# Patient Record
Sex: FEMALE | Race: WHITE | NOT HISPANIC OR LATINO | Employment: UNEMPLOYED | URBAN - METROPOLITAN AREA
[De-identification: names, ages, dates, MRNs, and addresses within clinical notes are randomized per-mention and may not be internally consistent; named-entity substitution may affect disease eponyms.]

---
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
## 2018-09-04 ENCOUNTER — HISTORICAL (OUTPATIENT)
Dept: WOUND CARE | Facility: HOSPITAL | Age: 18
End: 2018-09-04

## 2018-09-08 LAB — FINAL CULTURE: NORMAL

## 2019-01-23 ENCOUNTER — HISTORICAL (OUTPATIENT)
Dept: WOUND CARE | Facility: HOSPITAL | Age: 19
End: 2019-01-23

## 2019-01-25 LAB — FINAL CULTURE: NORMAL

## 2019-04-23 ENCOUNTER — HOSPITAL ENCOUNTER (OUTPATIENT)
Dept: TELEMEDICINE | Facility: HOSPITAL | Age: 19
Discharge: HOME | End: 2019-04-23

## 2019-04-23 PROCEDURE — 99282 PR EMERGENCY DEPT VISIT,LEVEL II: ICD-10-PCS | Mod: GT,,, | Performed by: PSYCHIATRY & NEUROLOGY

## 2019-04-23 PROCEDURE — 99282 EMERGENCY DEPT VISIT SF MDM: CPT | Mod: GT,,, | Performed by: PSYCHIATRY & NEUROLOGY

## 2019-04-23 NOTE — CONSULTS
[facility]  Psychiatry  Teleconsultation Note    Please see previous notes:    Patient agreeable to consultation via telepsychiatry.    Consultation started: 4/23/2019 at 3:26pm  The chief complaint leading to psychiatric consultation is: Si  This consultation was requested by Dr.William Guerra, the Emergency Department attending physician.  The location of the consulting psychiatrist is [address] [place] [address].  The patient location is [facility].  The patient arrived at the ED at: today    Also present with the patient at the time of the consultation: n/a    Subjective:     HPI  Hallie Torres is a 19 y.o. female with past psychiatric history of bipolar disorder severe substance use disorder with methamphetamine and Mojo was  BIB by EMS after she was OPC'd at [facility] drug rehab for making passive suicidal ideation.    Today patient reports that I made a statement about I'd rather die because I thought they were going to send me to [facility].  Patient reports that she was recently discharged from [facility] after a 7 day stay for substance induced mood disorder secondary to methamphetamine and Mojo.  Patient reports that she was discharged to residential rehab and today was her 2nd day there.  Patient reports that unfortunately her medications were not available yesterday or today and patient has had worsening in her mood mainly irritability.  Patient endorses symptoms of depression but states it is every day I feel depressed at been dealing with that for 20 years now patient vehemently denies any suicidal ideation any hopelessness helplessness anhedonia current leave.  Michelle patient reports that she has been diagnosed with bipolar disorder and when asked to describe her manic episode patient states I scream and holler and throw things patient does endorse periods of increased energy decreased need for sleep and psychosis.  However this overlaps with her substance  use.  Psychosis when assessing for psychotic symptoms patient reports I hear voices all the time but reports that there non commanding in nature and she is able to tune them out.  She denies any visual hallucinations any paranoia or delusions.    Collateral  Attempted to contact [facility] drug rehab several times a nursing informed counselor gogo had OPC patient.  This writer was put on hold for close to 12 min after being passed on to several different workers at the rehab unable to get any collateral.    Patient currently takes Risperdal 3 mg q.h.s., Depakote 500 mg b.i.d., Cogentin 0.5 mg q.h.s., trazodone 50 mg q.h.s.    Past psychiatric history  Patient reports being hospitalized many times most recent was 7 days ago.  Patient reports trying different medications but current medication regimen seems to be working the best  Patient denied any previous suicide attempts  Patient denies any history of violence  Patient currently does not have a psychiatrist but reports seeing a psychiatrist at the drug rehab.    Family history patient reports father has bipolar disorder.    Substance abuse history patient reports she has had longstanding problems with methamphetamine and Mojo primarily patient seemed guarded about discussing further details regarding her substance abuse but states she plans to continue her sobriety for 28 days by completing this drug rehab program.    Social history patient reports that she is currently living at the rehab and plans to be moving into a sober living house upon completing the program patient reports she is single and has a 5-year-old daughter that is with family.  Patient reports not serving in [employer] and does not have access to guns did not assess for [place].    Patient reports multiple warrants but would not discuss regarding what.    Patient reports that she has not allergic to any medications    Patient denies any head trauma or seizure history.    Objective:    Vitals  BP-128/87  P- 118  R-16  T-98.3  100% on RA    No labs done    Mental Status Exam:  Appearance: unremarkable, age appropriate  Behavior/Cooperation: appropriate guarded but redirectable  Speech: appropriate rate, volume and tone  Language: uses words appropriately; NO aphasia or dysarthria  Mood: anxious  Affect:  Irritable and anxious but appropriate to situation  Thought Process: normal and logical  Thought Content: normal, no suicidality, no homicidality, delusions, or paranoia  Level of Consciousness: Alert and Oriented x3  Memory:  Intact  Attention/concentration: appropriate for age/education.   Fund of Knowledge: appears adequate  Insight:   Intact  Judgment:  Intact    Assessment/Plan:        IMPRESSION:   Adjustment disorder  Substance induced mood disorder resolving  Substance abuse methamphetamine and Metro    RECOMMENDATIONS:     DISPOSITION- Once medically cleared;    Pt may be discharged to rehab, pt is established with [facility] they can f/u within 1-2 weeks   Discussed safety concerns and precautions and informed to Please return to ED for any worsening of psychiatric symptoms or any SI/HI/AVH     PSYCHIATRIC MEDICATIONS  · Scheduled- continue home meds    LEGAL-   Rescind [facility] because pt is no longer in any imminent danger of hurting self or others and not gravely disabled. Pt currently does not meet criteria nor benefit from from involuntary inpatient psychiatric admission.        Consulting clinician was informed of the encounter and consult note.  Please re-consult Telepsych services for further follow up or reassessment      More than 50% of the time was spent counseling/coordinating care    Consulting clinician was informed of the encounter and consult note.    Consultation ended: 4/23/2019 at 4:09pm  Joleen James MD   Psychiatry  [facility]

## 2022-04-09 ENCOUNTER — HISTORICAL (OUTPATIENT)
Dept: ADMINISTRATIVE | Facility: HOSPITAL | Age: 22
End: 2022-04-09

## 2022-04-27 VITALS
SYSTOLIC BLOOD PRESSURE: 120 MMHG | DIASTOLIC BLOOD PRESSURE: 70 MMHG | WEIGHT: 238.13 LBS | BODY MASS INDEX: 42.19 KG/M2 | HEIGHT: 63 IN

## 2023-01-25 ENCOUNTER — HOSPITAL ENCOUNTER (EMERGENCY)
Facility: HOSPITAL | Age: 23
Discharge: HOME | End: 2023-01-26
Attending: STUDENT IN AN ORGANIZED HEALTH CARE EDUCATION/TRAINING PROGRAM
Payer: MEDICAID

## 2023-01-25 DIAGNOSIS — L02.91 ABSCESS: Primary | ICD-10-CM

## 2023-01-25 DIAGNOSIS — L03.312 CELLULITIS OF BACK EXCEPT BUTTOCK: ICD-10-CM

## 2023-01-25 LAB
ALBUMIN SERPL-MCNC: 3.7 G/DL (ref 3.5–5)
ALBUMIN/GLOB SERPL: 1.1 RATIO (ref 1.1–2)
ALP SERPL-CCNC: 69 UNIT/L (ref 40–150)
ALT SERPL-CCNC: 9 UNIT/L (ref 0–55)
AST SERPL-CCNC: 13 UNIT/L (ref 5–34)
BASOPHILS # BLD AUTO: 0.05 X10(3)/MCL (ref 0–0.2)
BASOPHILS NFR BLD AUTO: 0.3 %
BILIRUBIN DIRECT+TOT PNL SERPL-MCNC: 0.5 MG/DL
BUN SERPL-MCNC: 6.8 MG/DL (ref 7–18.7)
CALCIUM SERPL-MCNC: 9.4 MG/DL (ref 8.4–10.2)
CHLORIDE SERPL-SCNC: 112 MMOL/L (ref 98–107)
CO2 SERPL-SCNC: 19 MMOL/L (ref 22–29)
CREAT SERPL-MCNC: 0.69 MG/DL (ref 0.55–1.02)
EOSINOPHIL # BLD AUTO: 0.18 X10(3)/MCL (ref 0–0.9)
EOSINOPHIL NFR BLD AUTO: 1.1 %
ERYTHROCYTE [DISTWIDTH] IN BLOOD BY AUTOMATED COUNT: 14.1 % (ref 11.5–17)
GFR SERPLBLD CREATININE-BSD FMLA CKD-EPI: >60 MLS/MIN/1.73/M2
GLOBULIN SER-MCNC: 3.4 GM/DL (ref 2.4–3.5)
GLUCOSE SERPL-MCNC: 88 MG/DL (ref 74–100)
HCT VFR BLD AUTO: 42.4 % (ref 37–47)
HGB BLD-MCNC: 14.5 GM/DL (ref 12–16)
IMM GRANULOCYTES # BLD AUTO: 0.09 X10(3)/MCL (ref 0–0.04)
IMM GRANULOCYTES NFR BLD AUTO: 0.5 %
LYMPHOCYTES # BLD AUTO: 3.09 X10(3)/MCL (ref 0.6–4.6)
LYMPHOCYTES NFR BLD AUTO: 18.3 %
MCH RBC QN AUTO: 28.7 PG
MCHC RBC AUTO-ENTMCNC: 34.2 MG/DL (ref 33–36)
MCV RBC AUTO: 83.8 FL (ref 80–94)
MONOCYTES # BLD AUTO: 1.21 X10(3)/MCL (ref 0.1–1.3)
MONOCYTES NFR BLD AUTO: 7.2 %
NEUTROPHILS # BLD AUTO: 12.27 X10(3)/MCL (ref 2.1–9.2)
NEUTROPHILS NFR BLD AUTO: 72.6 %
NRBC BLD AUTO-RTO: 0 %
PLATELET # BLD AUTO: 313 X10(3)/MCL (ref 130–400)
PMV BLD AUTO: 10.3 FL (ref 7.4–10.4)
POTASSIUM SERPL-SCNC: 3.9 MMOL/L (ref 3.5–5.1)
PROT SERPL-MCNC: 7.1 GM/DL (ref 6.4–8.3)
RBC # BLD AUTO: 5.06 X10(6)/MCL (ref 4.2–5.4)
SODIUM SERPL-SCNC: 140 MMOL/L (ref 136–145)
WBC # SPEC AUTO: 16.9 X10(3)/MCL (ref 4.5–11.5)

## 2023-01-25 PROCEDURE — 85025 COMPLETE CBC W/AUTO DIFF WBC: CPT | Performed by: NURSE PRACTITIONER

## 2023-01-25 PROCEDURE — 99284 EMERGENCY DEPT VISIT MOD MDM: CPT

## 2023-01-25 PROCEDURE — 10060 I&D ABSCESS SIMPLE/SINGLE: CPT

## 2023-01-25 PROCEDURE — 80053 COMPREHEN METABOLIC PANEL: CPT | Performed by: NURSE PRACTITIONER

## 2023-01-25 RX ORDER — LIDOCAINE HYDROCHLORIDE 10 MG/ML
10 INJECTION, SOLUTION EPIDURAL; INFILTRATION; INTRACAUDAL; PERINEURAL
Status: DISCONTINUED | OUTPATIENT
Start: 2023-01-25 | End: 2023-01-26 | Stop reason: HOSPADM

## 2023-01-25 NOTE — FIRST PROVIDER EVALUATION
"Medical screening examination initiated.  I have conducted a focused provider triage encounter, findings are as follows:    Brief history of present illness:  Patient states "spider bite" to her mid back starting last night.     There were no vitals filed for this visit.    Pertinent physical exam:  Awake, alert    Brief workup plan:  exam    Preliminary workup initiated; this workup will be continued and followed by the physician or advanced practice provider that is assigned to the patient when roomed.  "

## 2023-01-26 VITALS
RESPIRATION RATE: 16 BRPM | OXYGEN SATURATION: 100 % | DIASTOLIC BLOOD PRESSURE: 86 MMHG | HEART RATE: 99 BPM | SYSTOLIC BLOOD PRESSURE: 125 MMHG | TEMPERATURE: 99 F

## 2023-01-26 LAB
AMPHET UR QL SCN: POSITIVE
BARBITURATE SCN PRESENT UR: NEGATIVE
BENZODIAZ UR QL SCN: NEGATIVE
CANNABINOIDS UR QL SCN: POSITIVE
COCAINE UR QL SCN: NEGATIVE
FENTANYL UR QL SCN: NEGATIVE
HAV IGM SERPL QL IA: NONREACTIVE
HBV CORE IGM SERPL QL IA: NONREACTIVE
HBV SURFACE AG SERPL QL IA: NONREACTIVE
HCV AB SERPL QL IA: NONREACTIVE
HIV 1+2 AB+HIV1 P24 AG SERPL QL IA: NONREACTIVE
MDMA UR QL SCN: NEGATIVE
OPIATES UR QL SCN: NEGATIVE
PCP UR QL: NEGATIVE
PH UR: 7 [PH] (ref 3–11)

## 2023-01-26 PROCEDURE — 25000003 PHARM REV CODE 250: Performed by: STUDENT IN AN ORGANIZED HEALTH CARE EDUCATION/TRAINING PROGRAM

## 2023-01-26 PROCEDURE — 80307 DRUG TEST PRSMV CHEM ANLYZR: CPT | Performed by: STUDENT IN AN ORGANIZED HEALTH CARE EDUCATION/TRAINING PROGRAM

## 2023-01-26 PROCEDURE — 80074 ACUTE HEPATITIS PANEL: CPT | Performed by: STUDENT IN AN ORGANIZED HEALTH CARE EDUCATION/TRAINING PROGRAM

## 2023-01-26 PROCEDURE — 87389 HIV-1 AG W/HIV-1&-2 AB AG IA: CPT | Performed by: STUDENT IN AN ORGANIZED HEALTH CARE EDUCATION/TRAINING PROGRAM

## 2023-01-26 RX ORDER — MUPIROCIN 20 MG/G
OINTMENT TOPICAL 3 TIMES DAILY
Qty: 15 G | Refills: 0 | Status: SHIPPED | OUTPATIENT
Start: 2023-01-26 | End: 2023-02-10

## 2023-01-26 RX ORDER — HYDROCODONE BITARTRATE AND ACETAMINOPHEN 5; 325 MG/1; MG/1
1 TABLET ORAL EVERY 8 HOURS PRN
Qty: 8 TABLET | Refills: 0 | Status: SHIPPED | OUTPATIENT
Start: 2023-01-26 | End: 2023-01-30

## 2023-01-26 RX ORDER — SULFAMETHOXAZOLE AND TRIMETHOPRIM 800; 160 MG/1; MG/1
1 TABLET ORAL 2 TIMES DAILY
Qty: 14 TABLET | Refills: 0 | Status: SHIPPED | OUTPATIENT
Start: 2023-01-26 | End: 2023-02-02

## 2023-01-26 RX ORDER — HYDROCODONE BITARTRATE AND ACETAMINOPHEN 5; 325 MG/1; MG/1
1 TABLET ORAL EVERY 8 HOURS PRN
Qty: 8 TABLET | Refills: 0 | Status: SHIPPED | OUTPATIENT
Start: 2023-01-26 | End: 2023-01-26 | Stop reason: SDUPTHER

## 2023-01-26 RX ORDER — HYDROCODONE BITARTRATE AND ACETAMINOPHEN 5; 325 MG/1; MG/1
1 TABLET ORAL
Status: COMPLETED | OUTPATIENT
Start: 2023-01-26 | End: 2023-01-26

## 2023-01-26 RX ADMIN — HYDROCODONE BITARTRATE AND ACETAMINOPHEN 1 TABLET: 5; 325 TABLET ORAL at 12:01

## 2023-01-26 NOTE — ED PROVIDER NOTES
Encounter Date: 1/25/2023    SCRIBE #1 NOTE: I, Mago Hale, am scribing for, and in the presence of,  James Lares MD. I have scribed the following portions of the note - Other sections scribed: HPI, ANKITA, Physical exam.     History     Chief Complaint   Patient presents with    Wound Infection     Pt states noticed wound on back this AM w drainage. Bit by spider 2 months ago w a couple antibiotics and I&Ds w/o relief.        Patient is a 22 y.o. female, with no significant PMHx, who presents with complaint of an abscess on her back with onset this morning. Patient reports drainage from wound. She was bit by a spider 2 months ago, and was given antibiotics and had I&Ds without relief.      The history is provided by the patient.   Abscess   This is a new problem. The current episode started today. The problem has been unchanged. The abscess is present on the back. The abscess is characterized by draining and redness. Pertinent negatives include no fever and no sore throat.         Review of patient's allergies indicates:  No Known Allergies  History reviewed. No pertinent past medical history.  History reviewed. No pertinent surgical history.  History reviewed. No pertinent family history.     Review of Systems   Constitutional:  Negative for fever.   HENT:  Negative for sore throat.    Eyes:  Negative for visual disturbance.   Respiratory:  Negative for shortness of breath.    Cardiovascular:  Negative for chest pain.   Gastrointestinal:  Negative for abdominal pain.   Genitourinary:  Negative for dysuria.   Musculoskeletal:  Negative for joint swelling.   Skin:  Positive for wound. Negative for rash.   Neurological:  Negative for weakness.   Psychiatric/Behavioral:  Negative for confusion.      Physical Exam     Initial Vitals [01/25/23 1642]   BP Pulse Resp Temp SpO2   125/86 99 (!) 22 98.8 °F (37.1 °C) 100 %      MAP       --         Physical Exam    Nursing note and vitals reviewed.  Constitutional: She  appears well-developed and well-nourished.   HENT:   Head: Normocephalic and atraumatic.   Eyes: EOM are normal. Pupils are equal, round, and reactive to light.   Neck:   Normal range of motion.  Cardiovascular:  Normal rate, regular rhythm, normal heart sounds and intact distal pulses.           No murmur heard.  Pulmonary/Chest: Breath sounds normal. No respiratory distress. She has no wheezes. She has no rales.   Abdominal: Abdomen is soft. She exhibits no distension. There is no abdominal tenderness. There is no rebound.   Musculoskeletal:         General: No tenderness or edema. Normal range of motion.      Cervical back: Normal range of motion.      Comments: 5 by 4 cm area of erythema to left thoracic back with small area of induration.     Neurological: She is alert. She has normal strength. No cranial nerve deficit. GCS score is 15. GCS eye subscore is 4. GCS verbal subscore is 5. GCS motor subscore is 6.   Skin: Skin is warm and dry. Capillary refill takes less than 2 seconds. No rash noted. No erythema.   Psychiatric: She has a normal mood and affect.       ED Course   I & D - Incision and Drainage    Date/Time: 1/25/2023 11:00 PM  Location procedure was performed: [facility] EMERGENCY DEPARTMENT  Performed by: James Lares MD  Authorized by: James Lares MD   Consent Done: Not Needed  Type: abscess  Body area: trunk  Location details: back  Anesthesia: local infiltration    Anesthesia:  Local Anesthetic: lidocaine 1% without epinephrine    Patient sedated: no  Scalpel size: 11  Incision type: single straight  Complexity: simple  Drainage: pus  Drainage amount: moderate  Wound treatment: wound left open  Patient tolerance: Patient tolerated the procedure well with no immediate complications      Labs Reviewed   COMPREHENSIVE METABOLIC PANEL - Abnormal; Notable for the following components:       Result Value    Chloride 112 (*)     Carbon Dioxide 19 (*)     Blood Urea Nitrogen 6.8 (*)     All other  components within normal limits   CBC WITH DIFFERENTIAL - Abnormal; Notable for the following components:    WBC 16.9 (*)     Neut # 12.27 (*)     IG# 0.09 (*)     All other components within normal limits   DRUG SCREEN, URINE (BEAKER) - Abnormal; Notable for the following components:    Amphetamines, Urine Positive (*)     Cannabinoids, Urine Positive (*)     All other components within normal limits    Narrative:     Cut off concentrations:    Amphetamines - 1000 ng/ml  Barbiturates - 200 ng/ml  Benzodiazepine - 200 ng/ml  Cannabinoids (THC) - 50 ng/ml  Cocaine - 300 ng/ml  Fentanyl - 1.0 ng/ml  MDMA - 500 ng/ml  Opiates - 300 ng/ml   Phencyclidine (PCP) - 25 ng/ml    Specimen submitted for drug analysis and tested for pH and specific gravity in order to evaluate sample integrity. Suspect tampering if specific gravity is <1.003 and/or pH is not within the range of 4.5 - 8.0  False negatives may result form substances such as bleach added to urine.  False positives may result for the presence of a substance with similar chemical structure to the drug or its metabolite.    This test provides only a PRELIMINARY analytical test result. A more specific alternate chemical method must be used in order to obtain a confirmed analytical result. Gas chromatography/mass spectrometry (GC/MS) is the preferred confirmatory method. Other chemical confirmation methods are available. Clinical consideration and professional judgement should be applied to any drug of abuse test result, particularly when preliminary positive results are used.    Positive results will be confirmed only at the physicians request. Unconfirmed screening results are to be used only for medical purposes (treatment).        HEPATITIS PANEL, ACUTE - Normal   HIV 1 / 2 ANTIBODY - Normal   CBC W/ AUTO DIFFERENTIAL    Narrative:     The following orders were created for panel order CBC Auto Differential.  Procedure                               Abnormality          Status                     ---------                               -----------         ------                     CBC with Differential[[id number]]        Abnormal            Final result                 Please view results for these tests on the individual orders.   PATHOLOGIST INTERPRETATION          Imaging Results    None          Medications   HYDROcodone-acetaminophen 5-325 mg per tablet 1 tablet (1 tablet Oral Given 1/26/23 0030)     Medical Decision Making:   History:   Old Medical Records: I decided to obtain old medical records.  Differential Diagnosis:   Abscess, Cellulitis, Sebaceous cyst   Clinical Tests:   Lab Tests: Ordered and Reviewed  ED Management:  Patient is a 23 y/o presents for abscess.  See HPI/exam.  Abscess noted, I&D performed with pus expressed.  Hx of frequent abscess per patient.  Started on abx, discussed Hibiclens wash.  Reassessed patient.  Patient is resting comfortably.  Discussed all results.  Discussed need for follow-up.  Discussed return precautions.  Answered all questions at this time.  Hemodynamically stable for continued outpatient management with strict return precautions.  Patient verbalized understanding agreed to plan.     Medical Decision Making  Problems Addressed:  Abscess: acute illness or injury that poses a threat to life or bodily functions  Cellulitis of back except buttock: acute illness or injury    Amount and/or Complexity of Data Reviewed  Labs: ordered.          Scribe Attestation:   Scribe #1: I performed the above scribed service and the documentation accurately describes the services I performed. I attest to the accuracy of the note.    Attending Attestation:           Physician Attestation for Scribe:  Physician Attestation Statement for Scribe #1: I, James Lares MD, reviewed documentation, as scribed by Mago Hale in my presence, and it is both accurate and complete.                        Clinical Impression:   Final diagnoses:  [L02.91]  Abscess (Primary)  [L03.312] Cellulitis of back except buttock        ED Disposition Condition    Discharge Stable          ED Prescriptions       Medication Sig Dispense Start Date End Date Auth. Provider    sulfamethoxazole-trimethoprim 800-160mg (BACTRIM DS) 800-160 mg Tab () Take 1 tablet by mouth 2 (two) times daily. for 7 days 14 tablet 2023 James Lares MD    mupirocin (BACTROBAN) 2 % ointment () Apply topically 3 (three) times daily. for 15 days 15 g 2023 2/10/2023 James Lares MD    HYDROcodone-acetaminophen (NORCO) 5-325 mg per tablet  (Status: Discontinued) Take 1 tablet by mouth every 8 (eight) hours as needed for Pain. 8 tablet 2023 James Lares MD    HYDROcodone-acetaminophen (NORCO) 5-325 mg per tablet () Take 1 tablet by mouth every 8 (eight) hours as needed for Pain. 8 tablet 2023 James Lares MD          Follow-up Information       Follow up With Specialties Details Why Contact Info    Your primary care physician.        Bharti Sanchez, [facility]   [address] [place] [address]  [phone]               James Lares MD  02/15/23 8231

## 2023-01-26 NOTE — DISCHARGE INSTRUCTIONS
Follow up with your primary care physician.     Take antibiotic as prescribed.     Return to the emergency department if any fever, new or worsening pain, nausea, vomiting, difficulty breathing, or any other symptoms.

## 2023-01-28 LAB — PATH REV: NORMAL
